# Patient Record
Sex: FEMALE | Race: BLACK OR AFRICAN AMERICAN | NOT HISPANIC OR LATINO | Employment: OTHER | URBAN - METROPOLITAN AREA
[De-identification: names, ages, dates, MRNs, and addresses within clinical notes are randomized per-mention and may not be internally consistent; named-entity substitution may affect disease eponyms.]

---

## 2017-10-23 ENCOUNTER — OFFICE VISIT (OUTPATIENT)
Dept: OPHTHALMOLOGY | Facility: CLINIC | Age: 67
End: 2017-10-23
Payer: MEDICARE

## 2017-10-23 DIAGNOSIS — H40.013 OAG (OPEN ANGLE GLAUCOMA) SUSPECT, LOW RISK, BILATERAL: Primary | ICD-10-CM

## 2017-10-23 DIAGNOSIS — Z96.1 PSEUDOPHAKIA OF BOTH EYES: ICD-10-CM

## 2017-10-23 DIAGNOSIS — E11.9 DIABETES MELLITUS TYPE 2 WITHOUT RETINOPATHY: ICD-10-CM

## 2017-10-23 DIAGNOSIS — H43.811 POSTERIOR VITREOUS DETACHMENT OF RIGHT EYE: ICD-10-CM

## 2017-10-23 PROCEDURE — 92083 EXTENDED VISUAL FIELD XM: CPT | Mod: PBBFAC,PO | Performed by: OPHTHALMOLOGY

## 2017-10-23 PROCEDURE — 92250 FUNDUS PHOTOGRAPHY W/I&R: CPT | Mod: PBBFAC,PO | Performed by: OPHTHALMOLOGY

## 2017-10-23 PROCEDURE — 92014 COMPRE OPH EXAM EST PT 1/>: CPT | Mod: S$PBB,,, | Performed by: OPHTHALMOLOGY

## 2017-10-23 PROCEDURE — 99212 OFFICE O/P EST SF 10 MIN: CPT | Mod: PBBFAC,PO | Performed by: OPHTHALMOLOGY

## 2017-10-23 PROCEDURE — 99999 PR PBB SHADOW E&M-EST. PATIENT-LVL II: CPT | Mod: PBBFAC,,, | Performed by: OPHTHALMOLOGY

## 2017-10-23 NOTE — PROGRESS NOTES
SUBJECTIVE:   Vane Earl is a 67 y.o. female   Uncorrected distance visual acuity was 20/20 -1 in the right eye and 20/30 in the left eye.   Chief Complaint   Patient presents with    Glaucoma Suspect     hvf, dilation, sdp's    Blurred Vision     c/o glare at night        HPI:  HPI     Glaucoma Suspect    Additional comments: hvf, dilation, sdp's           Blurred Vision    Additional comments: c/o glare at night           Comments   1. DM 2015  2. PCIOL OS 2/11/15  3. PCIOL OD 2/25/15  4. COAG suspect    No drops       Last edited by Bri Zarate MA on 10/23/2017  1:22 PM. (History)        Assessment /Plan :  1. OAG (open angle glaucoma) suspect, low risk, bilateral No evidence of glaucoma at this time but based on risk factors recommend to continue monitoring.   2. Diabetes mellitus type 2 without retinopathy No diabetic retinopathy at this time. Reviewed diabetic eye precautions including avoiding tobacco use,  Good glucose control, and importance of regular follow up.    3. Pseudophakia of both eyes stable   4. Posterior vitreous detachment of right eye Patient seen and evaluated for PVD OD. No tears or breaks were seen after careful retinal evaluation. Discussed retinal detachment signs and symptoms including flashes of lights, floaters, perceived curtains or veils. Advised to patient to monitor visual status including increase in flashes and floaters, or the development of visual field changes including curtain and /or veils. Advised patient to RTC urgently if these symptoms occur. Explained the need for follow up exams to the patient even if there are no changes in the symptoms.       Return to clinic in 1 year  or as needed.  With 24-2 HVF, Dilation and SDP's

## 2020-12-21 ENCOUNTER — OFFICE VISIT (OUTPATIENT)
Dept: OPHTHALMOLOGY | Facility: CLINIC | Age: 70
End: 2020-12-21
Payer: MEDICARE

## 2020-12-21 DIAGNOSIS — H40.019 OPEN ANGLE WITH BORDERLINE FINDINGS, LOW RISK: ICD-10-CM

## 2020-12-21 DIAGNOSIS — E11.9 DIABETES MELLITUS TYPE 2 WITHOUT RETINOPATHY: Primary | ICD-10-CM

## 2020-12-21 DIAGNOSIS — Z96.1 PSEUDOPHAKIA: ICD-10-CM

## 2020-12-21 PROCEDURE — 92133 POSTERIOR SEGMENT OCT OPTIC NERVE(OCULAR COHERENCE TOMOGRAPHY) - OU - BOTH EYES: ICD-10-PCS | Mod: 26,S$PBB,, | Performed by: OPHTHALMOLOGY

## 2020-12-21 PROCEDURE — 92004 PR EYE EXAM, NEW PATIENT,COMPREHESV: ICD-10-PCS | Mod: S$PBB,,, | Performed by: OPHTHALMOLOGY

## 2020-12-21 PROCEDURE — 92004 COMPRE OPH EXAM NEW PT 1/>: CPT | Mod: S$PBB,,, | Performed by: OPHTHALMOLOGY

## 2020-12-21 PROCEDURE — 99999 PR PBB SHADOW E&M-EST. PATIENT-LVL III: ICD-10-PCS | Mod: PBBFAC,,, | Performed by: OPHTHALMOLOGY

## 2020-12-21 PROCEDURE — 92133 CPTRZD OPH DX IMG PST SGM ON: CPT | Mod: PBBFAC | Performed by: OPHTHALMOLOGY

## 2020-12-21 PROCEDURE — 99999 PR PBB SHADOW E&M-EST. PATIENT-LVL III: CPT | Mod: PBBFAC,,, | Performed by: OPHTHALMOLOGY

## 2020-12-21 PROCEDURE — 99213 OFFICE O/P EST LOW 20 MIN: CPT | Mod: PBBFAC,25 | Performed by: OPHTHALMOLOGY

## 2020-12-21 RX ORDER — LOSARTAN POTASSIUM 100 MG/1
TABLET ORAL
COMMUNITY
Start: 2020-11-29

## 2020-12-21 NOTE — PROGRESS NOTES
SUBJECTIVE  Vane Earl is 70 y.o. female  Uncorrected distance visual acuity was 20/30 +2 in the right eye and 20/30 +2 in the left eye.   Chief Complaint   Patient presents with    Glaucoma Suspect     yearly          HPI     Glaucoma Suspect      Additional comments: yearly              Comments     The patient states that she is having trouble seeing far away and her   eyes are sometimes dry. She denies any pain.     1. DM 2015  2. PCIOL OS 2/11/15  3. PCIOL OD 2/25/15  4. COAG suspect  5. PVD OD    No drops          Last edited by Irene Vega on 12/21/2020  1:49 PM. (History)         Assessment /Plan :  1. Diabetes mellitus type 2 without retinopathy No diabetic retinopathy at this time. Reviewed diabetic eye precautions including avoiding tobacco use,  Good glucose control, and importance of regular follow up.      2. Open angle with borderline findings of both eyes, low risk- No evidence of glaucoma at this time but based on risk factors recommend to continue monitoring.     3. Pseudophakia -stable      Return to clinic in 1 year  or as needed.  With GOCT and Dilation

## 2022-02-14 ENCOUNTER — OFFICE VISIT (OUTPATIENT)
Dept: OPHTHALMOLOGY | Facility: CLINIC | Age: 72
End: 2022-02-14
Payer: MEDICARE

## 2022-02-14 DIAGNOSIS — H40.019 OPEN ANGLE WITH BORDERLINE FINDINGS, LOW RISK: Primary | ICD-10-CM

## 2022-02-14 DIAGNOSIS — Z96.1 PSEUDOPHAKIA: ICD-10-CM

## 2022-02-14 DIAGNOSIS — H04.129 DRY EYE: ICD-10-CM

## 2022-02-14 DIAGNOSIS — E11.9 DIABETES MELLITUS TYPE 2 WITHOUT RETINOPATHY: ICD-10-CM

## 2022-02-14 PROCEDURE — 92133 POSTERIOR SEGMENT OCT OPTIC NERVE(OCULAR COHERENCE TOMOGRAPHY) - OU - BOTH EYES: ICD-10-PCS | Mod: 26,S$PBB,, | Performed by: OPHTHALMOLOGY

## 2022-02-14 PROCEDURE — 99213 PR OFFICE/OUTPT VISIT, EST, LEVL III, 20-29 MIN: ICD-10-PCS | Mod: S$PBB,,, | Performed by: OPHTHALMOLOGY

## 2022-02-14 PROCEDURE — 99213 OFFICE O/P EST LOW 20 MIN: CPT | Mod: S$PBB,,, | Performed by: OPHTHALMOLOGY

## 2022-02-14 PROCEDURE — 99999 PR PBB SHADOW E&M-EST. PATIENT-LVL III: CPT | Mod: PBBFAC,,, | Performed by: OPHTHALMOLOGY

## 2022-02-14 PROCEDURE — 92133 CPTRZD OPH DX IMG PST SGM ON: CPT | Mod: PBBFAC | Performed by: OPHTHALMOLOGY

## 2022-02-14 PROCEDURE — 99213 OFFICE O/P EST LOW 20 MIN: CPT | Mod: PBBFAC | Performed by: OPHTHALMOLOGY

## 2022-02-14 PROCEDURE — 99999 PR PBB SHADOW E&M-EST. PATIENT-LVL III: ICD-10-PCS | Mod: PBBFAC,,, | Performed by: OPHTHALMOLOGY

## 2022-02-14 NOTE — LETTER
[facility]  [address]  [place] [address]  Phone: [phone]  Fax: [phone]   February 14, 2022    Nikki Zelaya MD  [address] [address]    Patient: Vane Earl   MR Number: [id number]   YOB: 1950   Date of Visit: 2/14/2022       Dear Dr. Zelaya :    Thank you for referring Vane Earl to me for evaluation. Here is my assessment and plan of care:    Assessment/Plan :  1. Open angle with borderline findings, low risk No evidence of glaucoma at this time but based on risk factors recommend to continue monitoring.    Return to clinic in 6 months  or as needed.  With IOP Check and GOCT     2. Diabetes mellitus type 2 without retinopathy No diabetic retinopathy at this time. Reviewed diabetic eye precautions including avoiding tobacco use,  Good glucose control, and importance of regular follow up.    3. Pseudophakia    4.  Dry eye - Findings and symptoms consistent with mild dry eyes. Dry eye instruction sheet reviewed with patient recommending:AT's qid/titrate prn, Genteal Gel prn, Lubricating Oint qhs and prn and [place] 3 Fish Oils [id number] mg po bid           If you have questions, please do not hesitate to call me. I look forward to following Ms. Vane Earl along with you.    Sincerely,        Eber Castaneda MD

## 2022-02-14 NOTE — PROGRESS NOTES
SUBJECTIVE  Vane Earl is 71 y.o. female  Uncorrected distance visual acuity was 20/25 +1 in the right eye and 20/20 in the left eye.   Chief Complaint   Patient presents with    Diabetic Eye Exam     Pt here for annual DM exam. No pain or discomfort. Pt says she has noticed a decline in her distance vision.     Lab Results       Component                Value               Date                       HGBA1C                   6.8 (H)             02/03/2022                    HPI     Diabetic Eye Exam      Additional comments: Pt here for annual DM exam. No pain or discomfort.   Pt says she has noticed a decline in her distance vision.     Lab Results       Component                Value               Date                       HGBA1C                   6.8 (H)             02/03/2022                        Comments     Referred by Ophelia Mora([facility] did her phaco sx)-sister  1. DM 2015  2. PCIOL OS 2/11/15  3. PCIOL OD 2/25/15  4. COAG suspect  5. PVD OD    No drops          Last edited by Jorge Alberto Meléndez MA on 2/14/2022  8:34 AM. (History)         Assessment /Plan :  1. Open angle with borderline findings, low risk No evidence of glaucoma at this time but based on risk factors recommend to continue monitoring.    Return to clinic in 6 months  or as needed.  With IOP Check and GOCT     2. Diabetes mellitus type 2 without retinopathy No diabetic retinopathy at this time. Reviewed diabetic eye precautions including avoiding tobacco use,  Good glucose control, and importance of regular follow up.    3. Pseudophakia    4.  Dry eye - Findings and symptoms consistent with mild dry eyes. Dry eye instruction sheet reviewed with patient recommending:AT's qid/titrate prn, Genteal Gel prn, Lubricating Oint qhs and prn and [place] 3 Fish Oils [id number] mg po bid

## 2022-08-15 ENCOUNTER — OFFICE VISIT (OUTPATIENT)
Dept: OPHTHALMOLOGY | Facility: CLINIC | Age: 72
End: 2022-08-15
Payer: MEDICARE

## 2022-08-15 DIAGNOSIS — Z96.1 PSEUDOPHAKIA: ICD-10-CM

## 2022-08-15 DIAGNOSIS — H40.1131 PRIMARY OPEN ANGLE GLAUCOMA (POAG) OF BOTH EYES, MILD STAGE: Primary | ICD-10-CM

## 2022-08-15 DIAGNOSIS — H04.129 DRY EYE: ICD-10-CM

## 2022-08-15 PROCEDURE — 99999 PR PBB SHADOW E&M-EST. PATIENT-LVL III: CPT | Mod: PBBFAC,,, | Performed by: OPHTHALMOLOGY

## 2022-08-15 PROCEDURE — 99214 PR OFFICE/OUTPT VISIT, EST, LEVL IV, 30-39 MIN: ICD-10-PCS | Mod: S$PBB,,, | Performed by: OPHTHALMOLOGY

## 2022-08-15 PROCEDURE — 92133 POSTERIOR SEGMENT OCT OPTIC NERVE(OCULAR COHERENCE TOMOGRAPHY) - OU - BOTH EYES: ICD-10-PCS | Mod: 26,S$PBB,, | Performed by: OPHTHALMOLOGY

## 2022-08-15 PROCEDURE — 99214 OFFICE O/P EST MOD 30 MIN: CPT | Mod: S$PBB,,, | Performed by: OPHTHALMOLOGY

## 2022-08-15 PROCEDURE — 99999 PR PBB SHADOW E&M-EST. PATIENT-LVL III: ICD-10-PCS | Mod: PBBFAC,,, | Performed by: OPHTHALMOLOGY

## 2022-08-15 PROCEDURE — 92133 CPTRZD OPH DX IMG PST SGM ON: CPT | Mod: PBBFAC | Performed by: OPHTHALMOLOGY

## 2022-08-15 PROCEDURE — 99213 OFFICE O/P EST LOW 20 MIN: CPT | Mod: PBBFAC | Performed by: OPHTHALMOLOGY

## 2022-08-15 RX ORDER — LATANOPROST 50 UG/ML
1 SOLUTION/ DROPS OPHTHALMIC NIGHTLY
Qty: 3 ML | Refills: 12 | Status: SHIPPED | OUTPATIENT
Start: 2022-08-15 | End: 2023-10-02

## 2022-08-15 NOTE — PROGRESS NOTES
SUBJECTIVE  Vane Earl is 72 y.o. female  Uncorrected distance visual acuity was 20/25 in the right eye and 20/25 -2 in the left eye.   Chief Complaint   Patient presents with    Glaucoma Suspect     Pt reports for 6m GOCT and IOP check. Denies any pain or irritation. Va stable. Using AT prn.           HPI     Glaucoma Suspect      Additional comments: Pt reports for 6m GOCT and IOP check. Denies any   pain or irritation. Va stable. Using AT prn.               Comments     1. DM 2015  2. PCIOL OS 2/11/15  3. PCIOL OD 2/25/15  4. COAG suspect (Fhx brother)  5. PVD OD  6. Dry eyes    No drops            Last edited by Salas Del Real on 8/15/2022 11:11 AM. (History)         Assessment /Plan :  1. Primary open angle glaucoma (POAG) of both eyes, mild stage newly diagnosed today  Intraocular pressure (IOP) not within acceptable range relative to target IOP with risk of irreversible visual loss. Better IOP control is recommended. Treatment options may include change or additional medications, Selective Laser Trabeculoplasty (SLT laser), and/or incisional glaucoma surgery. Reviewed importance of continued compliance with treatment and follow up.     Patient chooses to add Latanoprost one drop in each eye as needed    Return to clinic in 1 month  or as needed.  With IOP Check     2. Pseudophakia  -- Condition stable, no therapeutic change required. Monitoring routinely.     3. Dry eye - recommend artificial tears one drop in each eye as needed

## 2022-09-08 ENCOUNTER — OFFICE VISIT (OUTPATIENT)
Dept: OPHTHALMOLOGY | Facility: CLINIC | Age: 72
End: 2022-09-08
Payer: MEDICARE

## 2022-09-08 DIAGNOSIS — Z96.1 PSEUDOPHAKIA: ICD-10-CM

## 2022-09-08 DIAGNOSIS — H40.1131 PRIMARY OPEN ANGLE GLAUCOMA (POAG) OF BOTH EYES, MILD STAGE: Primary | ICD-10-CM

## 2022-09-08 PROCEDURE — 99214 PR OFFICE/OUTPT VISIT, EST, LEVL IV, 30-39 MIN: ICD-10-PCS | Mod: S$PBB,,, | Performed by: OPHTHALMOLOGY

## 2022-09-08 PROCEDURE — 99999 PR PBB SHADOW E&M-EST. PATIENT-LVL III: CPT | Mod: PBBFAC,,, | Performed by: OPHTHALMOLOGY

## 2022-09-08 PROCEDURE — 99999 PR PBB SHADOW E&M-EST. PATIENT-LVL III: ICD-10-PCS | Mod: PBBFAC,,, | Performed by: OPHTHALMOLOGY

## 2022-09-08 PROCEDURE — 99213 OFFICE O/P EST LOW 20 MIN: CPT | Mod: PBBFAC | Performed by: OPHTHALMOLOGY

## 2022-09-08 PROCEDURE — 99214 OFFICE O/P EST MOD 30 MIN: CPT | Mod: S$PBB,,, | Performed by: OPHTHALMOLOGY

## 2022-09-08 NOTE — PROGRESS NOTES
SUBJECTIVE  Vane Earl is 72 y.o. female  Corrected distance visual acuity was 20/20 in the right eye and 20/25 -1 in the left eye.   Chief Complaint   Patient presents with    Glaucoma     1 mth IOP, latanoprost trial and IOP check   Pt states she is doing well with new drop           HPI     Glaucoma     Additional comments: 1 mth IOP, latanoprost trial and IOP check   Pt states she is doing well with new drop            Comments    PCP: Dr. Flores    Referred by Ophelia Mora([facility] did her phaco sx)-sister  1. DM 2015  2. PCIOL OS 2/11/15  3. PCIOL OD 2/25/15  4. Mild COAG goal = 17 (Fhx brother)  5. PVD OD  6. Dry eyes    Latanoprost QHS OU           Last edited by FADI Bello on 9/8/2022 11:13 AM.         Assessment /Plan :  1. Primary open angle glaucoma (POAG) of both eyes, mild stage Doing well, intraocular pressure (IOP) within acceptable range relative to target IOP and no evidence of progression. Continue current treatment. Reviewed importance of continued compliance with treatment and follow up.      Patient instructed to continue using the following glaucoma medication as follows:  Latanoprost one drop in each eye nightly    Return to clinic in 4 months  or as needed.  With IOP Check, GOCT, and HVF 24-2     2. Pseudophakia  -- Condition stable, no therapeutic change required. Monitoring routinely.

## 2023-01-12 ENCOUNTER — OFFICE VISIT (OUTPATIENT)
Dept: OPHTHALMOLOGY | Facility: CLINIC | Age: 73
End: 2023-01-12
Payer: MEDICARE

## 2023-01-12 DIAGNOSIS — Z96.1 PSEUDOPHAKIA: ICD-10-CM

## 2023-01-12 DIAGNOSIS — H04.129 DRY EYE: ICD-10-CM

## 2023-01-12 DIAGNOSIS — H40.1131 PRIMARY OPEN ANGLE GLAUCOMA (POAG) OF BOTH EYES, MILD STAGE: Primary | ICD-10-CM

## 2023-01-12 PROCEDURE — 92133 CPTRZD OPH DX IMG PST SGM ON: CPT | Mod: PBBFAC | Performed by: OPHTHALMOLOGY

## 2023-01-12 PROCEDURE — 92133 POSTERIOR SEGMENT OCT OPTIC NERVE(OCULAR COHERENCE TOMOGRAPHY) - OU - BOTH EYES: ICD-10-PCS | Mod: 26,S$PBB,, | Performed by: OPHTHALMOLOGY

## 2023-01-12 PROCEDURE — 99213 OFFICE O/P EST LOW 20 MIN: CPT | Mod: PBBFAC | Performed by: OPHTHALMOLOGY

## 2023-01-12 PROCEDURE — 92083 EXTENDED VISUAL FIELD XM: CPT | Mod: PBBFAC | Performed by: OPHTHALMOLOGY

## 2023-01-12 PROCEDURE — 99214 PR OFFICE/OUTPT VISIT, EST, LEVL IV, 30-39 MIN: ICD-10-PCS | Mod: S$PBB,,, | Performed by: OPHTHALMOLOGY

## 2023-01-12 PROCEDURE — 99214 OFFICE O/P EST MOD 30 MIN: CPT | Mod: S$PBB,,, | Performed by: OPHTHALMOLOGY

## 2023-01-12 PROCEDURE — 99999 PR PBB SHADOW E&M-EST. PATIENT-LVL III: ICD-10-PCS | Mod: PBBFAC,,, | Performed by: OPHTHALMOLOGY

## 2023-01-12 PROCEDURE — 99999 PR PBB SHADOW E&M-EST. PATIENT-LVL III: CPT | Mod: PBBFAC,,, | Performed by: OPHTHALMOLOGY

## 2023-01-12 PROCEDURE — 92083 HUMPHREY VISUAL FIELD - OU - BOTH EYES: ICD-10-PCS | Mod: 26,S$PBB,, | Performed by: OPHTHALMOLOGY

## 2023-01-12 NOTE — PROGRESS NOTES
SUBJECTIVE  Vane Earl is 72 y.o. female  Uncorrected distance visual acuity was 20/20 -1 in the right eye and 20/25 in the left eye.   Chief Complaint   Patient presents with    Glaucoma     Patient reports for 4 month IOP check. Using gtts as advised. Denies pain or irritation at this time. Patient reports of visual stability since previous visit.             HPI     Glaucoma     Additional comments: Patient reports for 4 month IOP check. Using gtts as   advised. Denies pain or irritation at this time. Patient reports of visual   stability since previous visit.              Comments    PCP: Dr. Zelaya    Referred by Ophelia Mora([facility] did her phaco sx)-sister  1. DM 2015  2. PCIOL OS 2/11/15  3. PCIOL OD 2/25/15  4. Mild COAG goal = 17 (Fhx brother)  5. PVD OD  6. Dry eyes    Latanoprost QHS OU           Last edited by Lei Parsons on 1/12/2023 10:55 AM.         Assessment /Plan :  1. Primary open angle glaucoma (POAG) of both eyes, mild stage Doing well, intraocular pressure (IOP) within acceptable range relative to target IOP and no evidence of progression. Continue current treatment. Reviewed importance of continued compliance with treatment and follow up.      Patient instructed to continue using the following glaucoma medication as follows:  Latanoprost one drop in each eye nightly    Return to clinic in 4 months  or as needed.  With Dilation     2. Pseudophakia  -- Condition stable, no therapeutic change required. Monitoring routinely.     3. Dry eye  -- Condition stable, no therapeutic change required. Monitoring routinely.

## 2023-05-15 ENCOUNTER — OFFICE VISIT (OUTPATIENT)
Dept: OPHTHALMOLOGY | Facility: CLINIC | Age: 73
End: 2023-05-15
Payer: MEDICARE

## 2023-05-15 DIAGNOSIS — H40.1131 PRIMARY OPEN ANGLE GLAUCOMA (POAG) OF BOTH EYES, MILD STAGE: Primary | ICD-10-CM

## 2023-05-15 DIAGNOSIS — H26.493 PCO (POSTERIOR CAPSULAR OPACIFICATION), BILATERAL: ICD-10-CM

## 2023-05-15 DIAGNOSIS — Z96.1 PSEUDOPHAKIA: ICD-10-CM

## 2023-05-15 DIAGNOSIS — E11.9 DIABETES MELLITUS TYPE 2 WITHOUT RETINOPATHY: ICD-10-CM

## 2023-05-15 DIAGNOSIS — H04.129 DRY EYE: ICD-10-CM

## 2023-05-15 PROCEDURE — 99214 OFFICE O/P EST MOD 30 MIN: CPT | Mod: 57,S$PBB,, | Performed by: OPHTHALMOLOGY

## 2023-05-15 PROCEDURE — 99213 OFFICE O/P EST LOW 20 MIN: CPT | Mod: PBBFAC,25 | Performed by: OPHTHALMOLOGY

## 2023-05-15 PROCEDURE — 66821 AFTER CATARACT LASER SURGERY: CPT | Mod: 50,S$PBB,, | Performed by: OPHTHALMOLOGY

## 2023-05-15 PROCEDURE — 66821 PR DISCISSION,2ND CATARACT,LASER: ICD-10-PCS | Mod: 50,S$PBB,, | Performed by: OPHTHALMOLOGY

## 2023-05-15 PROCEDURE — 99999 PR PBB SHADOW E&M-EST. PATIENT-LVL III: ICD-10-PCS | Mod: PBBFAC,,, | Performed by: OPHTHALMOLOGY

## 2023-05-15 PROCEDURE — 99999 PR PBB SHADOW E&M-EST. PATIENT-LVL III: CPT | Mod: PBBFAC,,, | Performed by: OPHTHALMOLOGY

## 2023-05-15 PROCEDURE — 99214 PR OFFICE/OUTPT VISIT, EST, LEVL IV, 30-39 MIN: ICD-10-PCS | Mod: 57,S$PBB,, | Performed by: OPHTHALMOLOGY

## 2023-05-15 PROCEDURE — 66821 AFTER CATARACT LASER SURGERY: CPT | Mod: 50,PBBFAC | Performed by: OPHTHALMOLOGY

## 2023-05-15 RX ORDER — PREDNISOLONE ACETATE 10 MG/ML
1 SUSPENSION/ DROPS OPHTHALMIC 4 TIMES DAILY
Qty: 5 ML | Refills: 0 | Status: SHIPPED | OUTPATIENT
Start: 2023-05-15 | End: 2023-05-22

## 2023-05-15 NOTE — PROGRESS NOTES
SUBJECTIVE  Vane Earl is 73 y.o. female  Uncorrected distance visual acuity was 20/20 in the right eye and 20/30 in the left eye.   Chief Complaint   Patient presents with    Glaucoma Suspect     Patient states va is stable. Patient states she is using her eye drops as directed. Patient states she sometimes has a dull pain but it is not unbearable. Patient denies any irritation at this time.          HPI     Glaucoma Suspect     Additional comments: Patient states va is stable. Patient states she is   using her eye drops as directed. Patient states she sometimes has a dull   pain but it is not unbearable. Patient denies any irritation at this time.           Comments    1. DM 2015  2. PCIOL OS 2/11/15  3. PCIOL OD 2/25/15  4. Mild COAG goal = 17 (Fhx brother)  5. PVD OD  6. Dry eyes    Latanoprost QHS OU            Last edited by Sayra Yee on 5/15/2023 10:58 AM.         Assessment /Plan :  1. Primary open angle glaucoma (POAG) of both eyes, mild stage    2. PCO (posterior capsular opacification), bilateral Yag Capsulotomy Procedure:    73 y.o. year old patient experiencing a symptomatic decrease in vision OU with inability to perform activities of daily living including reading.    SLE: Posterior intraocular lens implant with capsular fibrosis     Risks, benefits and alternatives of Yag Laser Capsulotomy discussed. Including risks of retinal detachment (1-3%), macular edema, dislocated implant, pain, inflammation elevated intraocular pressure and vision loss. Consent signed.    Medications given:  Apraclonidine gtt  Tetracaine gtt    OD Laser energy settings:  107.7  Total mJ   61  bursts    Post Laser IOP 11 mmHg    OS Laser energy settings:  157.7  Total mJ   83  bursts    Post Laser IOP 12 mmHg    IMPRESSION:  Yag Capsulotomy OU well tolerated    PLAN:  1. Prednisolone 1% QID over 1 week  2. Postoperative precautions discussed  3. RTC 2-3 weeks or prn     3. Diabetes mellitus type 2 without retinopathy  No diabetic retinopathy at this time. Reviewed diabetic eye precautions including avoiding tobacco use,  Good glucose control, and importance of regular follow up.      4. Pseudophakia  -- Condition stable, no therapeutic change required. Monitoring routinely.     5. Dry eye

## 2023-06-05 ENCOUNTER — OFFICE VISIT (OUTPATIENT)
Dept: OPHTHALMOLOGY | Facility: CLINIC | Age: 73
End: 2023-06-05
Payer: COMMERCIAL

## 2023-06-05 DIAGNOSIS — Z98.890 POST-OPERATIVE STATE: Primary | ICD-10-CM

## 2023-06-05 PROCEDURE — 99999 PR PBB SHADOW E&M-EST. PATIENT-LVL III: CPT | Mod: PBBFAC,,, | Performed by: OPHTHALMOLOGY

## 2023-06-05 PROCEDURE — 99024 POSTOP FOLLOW-UP VISIT: CPT | Mod: S$GLB,,, | Performed by: OPHTHALMOLOGY

## 2023-06-05 PROCEDURE — 99999 PR PBB SHADOW E&M-EST. PATIENT-LVL III: ICD-10-PCS | Mod: PBBFAC,,, | Performed by: OPHTHALMOLOGY

## 2023-06-05 PROCEDURE — 99024 PR POST-OP FOLLOW-UP VISIT: ICD-10-PCS | Mod: S$GLB,,, | Performed by: OPHTHALMOLOGY

## 2023-06-05 NOTE — PROGRESS NOTES
SUBJECTIVE  Vane Earl is 73 y.o. female  Uncorrected distance visual acuity was 20/30 in the right eye and 20/25 in the left eye.   Chief Complaint   Patient presents with    Post-op Evaluation          HPI    States that her vision is stable but complains of glare in her OD. States   that she has been compliant with gtts as directed.     1. DM 2015  2. PCIOL OS 2/11/15  3. PCIOL OD 2/25/15  4. YAG OU 05/15/23  5. Mild COAG goal = 17 (Fhx brother)  6. PVD OD  7. Dry eyes    Latanoprost QHS OU    Last edited by Toma Feliz on 6/5/2023 11:27 AM.         Assessment /Plan :  1. Post-operative state Exam:  SLE:  L/L- normal  S/C- white  K- stable  AC- no cells  LENS- PCIOL with clear capsulotomy    Assessment:    S/P Yag Capsulotomy OU . Discussed options for spectacle correction and pt elects to use OTC Readers. Recommend follow with Dr. Castaneda in 4 months for IOP check and GOCT, or sooner if needed.  Continue Latanoprost one drop in each eye every night

## 2023-09-30 DIAGNOSIS — H40.1131 PRIMARY OPEN ANGLE GLAUCOMA (POAG) OF BOTH EYES, MILD STAGE: ICD-10-CM

## 2023-10-02 RX ORDER — LATANOPROST 50 UG/ML
1 SOLUTION/ DROPS OPHTHALMIC DAILY
Qty: 2.5 ML | Refills: 12 | Status: SHIPPED | OUTPATIENT
Start: 2023-10-02 | End: 2024-10-01

## 2023-10-05 ENCOUNTER — OFFICE VISIT (OUTPATIENT)
Dept: OPHTHALMOLOGY | Facility: CLINIC | Age: 73
End: 2023-10-05
Payer: COMMERCIAL

## 2023-10-05 DIAGNOSIS — Z96.1 PSEUDOPHAKIA: ICD-10-CM

## 2023-10-05 DIAGNOSIS — H40.1131 PRIMARY OPEN ANGLE GLAUCOMA (POAG) OF BOTH EYES, MILD STAGE: Primary | ICD-10-CM

## 2023-10-05 PROCEDURE — 99214 PR OFFICE/OUTPT VISIT, EST, LEVL IV, 30-39 MIN: ICD-10-PCS | Mod: S$PBB,,, | Performed by: OPHTHALMOLOGY

## 2023-10-05 PROCEDURE — 92133 CPTRZD OPH DX IMG PST SGM ON: CPT | Mod: PBBFAC | Performed by: OPHTHALMOLOGY

## 2023-10-05 PROCEDURE — 92133 POSTERIOR SEGMENT OCT OPTIC NERVE(OCULAR COHERENCE TOMOGRAPHY) - OU - BOTH EYES: ICD-10-PCS | Mod: 26,S$PBB,, | Performed by: OPHTHALMOLOGY

## 2023-10-05 PROCEDURE — 99213 OFFICE O/P EST LOW 20 MIN: CPT | Mod: PBBFAC | Performed by: OPHTHALMOLOGY

## 2023-10-05 PROCEDURE — 99214 OFFICE O/P EST MOD 30 MIN: CPT | Mod: S$PBB,,, | Performed by: OPHTHALMOLOGY

## 2023-10-05 PROCEDURE — 99999 PR PBB SHADOW E&M-EST. PATIENT-LVL III: CPT | Mod: PBBFAC,,, | Performed by: OPHTHALMOLOGY

## 2023-10-05 PROCEDURE — 99999 PR PBB SHADOW E&M-EST. PATIENT-LVL III: ICD-10-PCS | Mod: PBBFAC,,, | Performed by: OPHTHALMOLOGY

## 2023-10-05 NOTE — PROGRESS NOTES
SUBJECTIVE  Vane Earl is 73 y.o. female  Uncorrected distance visual acuity was 20/25 in the right eye and 20/20 in the left eye.   Chief Complaint   Patient presents with    Glaucoma     4 month IOP and GOCT follow up. VA is doing well, no changes. No pain or irritation. Compliant with gtts.           HPI     Glaucoma     Additional comments: 4 month IOP and GOCT follow up. VA is doing well, no   changes. No pain or irritation. Compliant with gtts.            Comments    PCP: Dr. Zelaya    Referred by Ophelia Mora([facility] did her phaco sx)-sister  1. DM 2015  2. PCIOL OS 2/11/15  3. PCIOL OD 2/25/15  4. YAG OU 05/15/23  5. Mild COAG goal = 17 (Fhx brother)  6. PVD OD  7. Dry eyes    Latanoprost QHS OU            Last edited by Kleber Aguiar on 10/5/2023 10:21 AM.         Assessment /Plan :  1. Primary open angle glaucoma (POAG) of both eyes, mild stage Doing well, intraocular pressure (IOP) within acceptable range relative to target IOP and no evidence of progression. Continue current treatment. Reviewed importance of continued compliance with treatment and follow up.      Patient instructed to continue using the following glaucoma medication as follows:  Latanoprost one drop in each eye nightly    Return to clinic in 4 months  or as needed.  With IOP Check     2. Pseudophakia  -- Condition stable, no therapeutic change required. Monitoring routinely.

## 2024-02-08 ENCOUNTER — OFFICE VISIT (OUTPATIENT)
Dept: OPHTHALMOLOGY | Facility: CLINIC | Age: 74
End: 2024-02-08
Payer: MEDICARE

## 2024-02-08 DIAGNOSIS — Z96.1 PSEUDOPHAKIA: ICD-10-CM

## 2024-02-08 DIAGNOSIS — H40.1131 PRIMARY OPEN ANGLE GLAUCOMA (POAG) OF BOTH EYES, MILD STAGE: Primary | ICD-10-CM

## 2024-02-08 PROCEDURE — 99213 OFFICE O/P EST LOW 20 MIN: CPT | Mod: PBBFAC | Performed by: OPHTHALMOLOGY

## 2024-02-08 PROCEDURE — 99999 PR PBB SHADOW E&M-EST. PATIENT-LVL III: CPT | Mod: PBBFAC,,, | Performed by: OPHTHALMOLOGY

## 2024-02-08 PROCEDURE — 99214 OFFICE O/P EST MOD 30 MIN: CPT | Mod: S$PBB,,, | Performed by: OPHTHALMOLOGY

## 2024-02-08 NOTE — PROGRESS NOTES
SUBJECTIVE  Vane Earl is 73 y.o. female  Uncorrected distance visual acuity was 20/20 -1 in the right eye and 20/20 in the left eye.   Chief Complaint   Patient presents with    Glaucoma     Pt here for 4m IOP chk. No pain or discomfort. VA stable. 100% compliant with gtts.           HPI     Glaucoma     Additional comments: Pt here for 4m IOP chk. No pain or discomfort. VA   stable. 100% compliant with gtts.            Comments    Referred by Ophelia Mora([facility] did her phaco sx)-sister  1. Mild COAG goal = 17 (Fhx brother)  2. PCIOL OS 2/11/15  3. PCIOL OD 2/25/15  4. YAG OU 05/15/23  5. DM 2015  6. PVD OD  7. Dry eyes    Latanoprost QHS OU             Last edited by Jorge Alberto Meléndez MA on 2/8/2024 10:12 AM.         Assessment /Plan :  1. Primary open angle glaucoma (POAG) of both eyes, mild stage Doing well, intraocular pressure (IOP) within acceptable range relative to target IOP and no evidence of progression. Continue current treatment. Reviewed importance of continued compliance with treatment and follow up.      Patient instructed to continue using the following glaucoma medication as follows:  Latanoprost one drop in each eye nightly    Return to clinic in 4 months  or as needed.  With GOCT, Dilation, and HVF 24-2     2. Pseudophakia  -- Condition stable, no therapeutic change required. Monitoring routinely.

## 2024-07-01 ENCOUNTER — HOSPITAL ENCOUNTER (OUTPATIENT)
Dept: RADIOLOGY | Facility: HOSPITAL | Age: 74
Discharge: HOME | End: 2024-07-01
Attending: ORTHOPAEDIC SURGERY
Payer: COMMERCIAL

## 2024-07-01 ENCOUNTER — OFFICE VISIT (OUTPATIENT)
Dept: ORTHOPEDICS | Facility: CLINIC | Age: 74
End: 2024-07-01
Payer: MEDICARE

## 2024-07-01 VITALS
DIASTOLIC BLOOD PRESSURE: 88 MMHG | HEIGHT: 69 IN | BODY MASS INDEX: 30.51 KG/M2 | WEIGHT: 206 LBS | SYSTOLIC BLOOD PRESSURE: 130 MMHG

## 2024-07-01 DIAGNOSIS — M22.41 CHONDROMALACIA PATELLAE, RIGHT: Primary | ICD-10-CM

## 2024-07-01 PROCEDURE — 73560 X-RAY EXAM OF KNEE 1 OR 2: CPT | Mod: TC,PN,RT

## 2024-07-01 PROCEDURE — 99999 PR PBB SHADOW E&M-EST. PATIENT-LVL V: CPT | Mod: PBBFAC,,, | Performed by: ORTHOPAEDIC SURGERY

## 2024-07-01 PROCEDURE — 73560 X-RAY EXAM OF KNEE 1 OR 2: CPT | Mod: 26,RT,, | Performed by: RADIOLOGY

## 2024-07-01 RX ORDER — LANCETS
1 EACH MISCELLANEOUS
COMMUNITY
Start: 2023-11-29

## 2024-07-01 RX ORDER — TRIAMCINOLONE ACETONIDE 40 MG/ML
40 INJECTION, SUSPENSION INTRA-ARTICULAR; INTRAMUSCULAR
Status: DISCONTINUED | OUTPATIENT
Start: 2024-07-01 | End: 2024-07-01 | Stop reason: HOSPADM

## 2024-07-01 RX ORDER — AMLODIPINE BESYLATE 10 MG/1
1 TABLET ORAL DAILY
COMMUNITY
Start: 2024-02-29 | End: 2025-02-28

## 2024-07-01 RX ORDER — BLOOD SUGAR DIAGNOSTIC
STRIP MISCELLANEOUS
COMMUNITY

## 2024-07-01 RX ORDER — FAMOTIDINE 20 MG/1
20 TABLET, FILM COATED ORAL 2 TIMES DAILY
COMMUNITY

## 2024-07-01 RX ORDER — FLUTICASONE PROPIONATE 50 MCG
1 SPRAY, SUSPENSION (ML) NASAL
COMMUNITY

## 2024-07-01 RX ORDER — LANCETS 33 GAUGE
EACH MISCELLANEOUS
COMMUNITY

## 2024-07-01 RX ADMIN — TRIAMCINOLONE ACETONIDE 40 MG: 40 INJECTION, SUSPENSION INTRA-ARTICULAR; INTRAMUSCULAR at 10:07

## 2024-07-01 NOTE — PROGRESS NOTES
Subjective:       Patient ID: Vane Earl is a 74 y.o. female.    Chief Complaint: Pain of the Right Knee (Patient is here with complaints of right knee pain x 4 months. Has off and on shooting pain that radiates as well as feeling of wanting to give way. Also popping and grinding )      History of Present Illness    Prior to meeting with the patient I reviewed the medical chart in [facility]. This included reviewing the previous progress notes from our office, review of the patient's last appointment with their primary care provider, review of any visits to the emergency room, and review of any pain management appointments or procedures.   History of chronic right knee pain without trauma does note popping and locking in her knee.    Current Medications  Current Outpatient Medications   Medication Sig Dispense Refill    albuterol (PROVENTIL/VENTOLIN HFA) 90 mcg/actuation inhaler Inhale 2 puffs into the lungs every 6 (six) hours as needed for Wheezing.      amLODIPine (NORVASC) 10 MG tablet Take 1 tablet by mouth once daily.      ammonium lactate 12 % Crea   6    aspirin 81 MG Chew Take 81 mg by mouth once daily.      atorvastatin (LIPITOR) 40 MG tablet Take 40 mg by mouth once daily.      blood sugar diagnostic Strp 1 strip by Other route.      empagliflozin (JARDIANCE) 10 mg tablet Take 1 tablet by mouth once daily.      famotidine (PEPCID) 20 MG tablet Take 20 mg by mouth 2 (two) times daily.      fluticasone propionate (FLONASE) 50 mcg/actuation nasal spray 1 spray by Nasal route.      fluticasone-salmeterol 100-50 mcg/dose (ADVAIR) 100-50 mcg/dose diskus inhaler Inhale 1 puff into the lungs 2 (two) times daily.      lancets Misc 1 Device by Other route.      latanoprost (XALATAN) 0.005 % ophthalmic solution Place 1 drop into both eyes once daily. 2.5 mL 12    levocetirizine (XYZAL) 5 MG tablet       losartan (COZAAR) 100 MG tablet TK 1 T PO D      metoprolol succinate (TOPROL-XL) 100 MG 24 hr tablet Take 100 mg  by mouth once daily.      ONETOUCH DELICA PLUS LANCET 33 gauge Misc Apply topically.      ONETOUCH ULTRA TEST Strp Use      potassium chloride SA (KLOR-CON M15) 15 MEQ tablet Take 15 mEq by mouth once daily.      rabeprazole (ACIPHEX) 20 mg tablet Take 20 mg by mouth once daily.      tramadol (ULTRAM) 50 mg tablet   1    triamterene-hydrochlorothiazide 75-50 mg (MAXZIDE) 75-50 mg per tablet       valsartan (DIOVAN) 320 MG tablet Take 320 mg by mouth once daily.       No current facility-administered medications for this visit.       Allergies  Review of patient's allergies indicates:   Allergen Reactions    Bactrim [sulfamethoxazole-trimethoprim] Rash    Pcn [penicillins] Swelling and Rash       Past Medical History  Past Medical History:   Diagnosis Date    Acute on chronic diastolic congestive heart failure 12/10/2014    Asthma     Diabetes mellitus     GERD (gastroesophageal reflux disease)     Hyperlipidemia     Hypertension        Surgical History  Past Surgical History:   Procedure Laterality Date    CATARACT EXTRACTION Right 2/20/15    CATARACT EXTRACTION Left 2/11/15     SECTION      HYSTERECTOMY         Family History:   Family History   Problem Relation Name Age of Onset    Cancer Father      Cataracts Sister      Hypertension Sister      Cancer Sister      Hypertension Brother      Diabetes Brother      Strabismus Neg Hx      Retinal detachment Neg Hx      Macular degeneration Neg Hx      Glaucoma Neg Hx      Blindness Neg Hx      Amblyopia Neg Hx         Social History:   Social History     Socioeconomic History    Marital status:    Tobacco Use    Smoking status: Former    Smokeless tobacco: Never   Substance and Sexual Activity    Alcohol use: No    Drug use: No     Social Determinants of Health     Financial Resource Strain: Low Risk  (5/3/2021)    Received from [facility], [facility]    Overall Financial Resource Strain (CARDIA)     Difficulty of Paying Living Expenses: Not hard  at all   Food Insecurity: No Food Insecurity (5/3/2021)    [facility]    Hunger Vital Sign     Worried About Running Out of Food in the Last Year: Never true     Ran Out of Food in the Last Year: Never true   Transportation Needs: No Transportation Needs (5/3/2021)    [facility]    PRAPARE - Transportation     Lack of Transportation (Medical): No     Lack of Transportation (Non-Medical): No   Physical Activity: Insufficiently Active (5/3/2021)    [facility]    Exercise Vital Sign     Days of Exercise per Week: 3 days     Minutes of Exercise per Session: 40 min   Stress: No Stress Concern Present (5/3/2021)    Received from [facility]    [employer] - Occupational Stress Questionnaire     Feeling of Stress : Not at all       Hospitalization/Major Diagnostic Procedure:     Review of Systems     General/Constitutional:  Chills denies. Fatigue denies. Fever denies. Weight gain denies. Weight loss denies.    Respiratory:  Shortness of breath denies.    Cardiovascular:  Chest pain denies.    Gastrointestinal:  Constipation denies. Diarrhea denies. Nausea denies. Vomiting denies.     Hematology:  Easy bruising denies. Prolonged bleeding denies.     Genitourinary:  Frequent urination denies. Pain in lower back denies. Painful urination denies.     Musculoskeletal:  See HPI for details    Skin:  Rash denies.    Neurologic:  Dizziness denies. Gait abnormalities denies. Seizures denies. Tingling/Numbess denies.    Psychiatric:  Anxiety denies. Depressed mood denies.     Objective:   Vital Signs:   Vitals:    07/01/24 1035   BP: 130/88        Physical Exam      General Examination:     Constitutional: The patient is alert and oriented to lace person and time. Mood is pleasant.     Head/Face: Normal facial features normal eyebrows    Eyes: Normal extraocular motion bilaterally    Lungs: Respirations are  equal and unlabored    Gait is coordinated.    Cardiovascular: There are no swelling or varicosities present.    Lymphatic: Negative for adenopathy    Skin: Normal    Neurological: Level of consciousness normal. Oriented to place person and time and situation    Psychiatric: Oriented to time place person and situation    Mild valgus alignment mild tenderness lateral joint line some patellofemoral crepitance noted [place]'s test negative Lachman's test negative    XRAY Report/ Interpretation :  AP lateral x-rays right knee were taken and personally reviewed today overall the appear to be normal with no significant degenerative changes      Assessment:       1. Chondromalacia patellae, right        Plan:       Vane was seen today for pain.    Diagnoses and all orders for this visit:    Chondromalacia patellae, right  -     X-Ray Knee 1 or 2 View Right  -     Large Joint Aspiration/Injection: R knee         Follow up if symptoms worsen or fail to improve.    Treatment options were discussed patient agreed to and underwent a steroid xylocaine injection in the right knee 4 cc Kenalog 3 cc of xylocaine no side effects were noted compressive dressing applied    Patient advised to contact me if injection unsuccessful if so we will proceed to MRI study otherwise return as needed      Treatment options were discussed with regards to the nature of the medical condition. Conservative pain intervention and surgical options were discussed in detail. The probability of success of each separate treatment option was discussed. The patient expressed a clear understanding of the treatment options. With regards to surgery, the procedure risk, benefits, complications, and outcomes were discussed. No guarantees were given with regards to surgical outcome.   The risk of complications, morbidity, and mortality of patient management decisions have been made at the time of this visit. These are associated with the patient's problems,  diagnostic procedures and treatment options. This includes the possible management options selected and those considered but not selected by the patient after shared medical decision making we discussed with the patient.     This note was created using Dragon voice recognition software that occasionally misinterpreted phrases or words.

## 2024-07-01 NOTE — PROCEDURES
Large Joint Aspiration/Injection: R knee    Date/Time: 7/1/2024 10:00 AM    Performed by: Kam Finney MD  Authorized by: Kam Finney MD    Consent Done?:  Yes (Verbal)  Indications:  Pain  Site marked: the procedure site was marked    Timeout: prior to procedure the correct patient, procedure, and site was verified    Prep: patient was prepped and draped in usual sterile fashion      Local anesthesia used?: Yes    Local anesthetic:  Lidocaine 1% without epinephrine    Details:  Needle Size:  22 G  Ultrasonic Guidance for needle placement?: No    Location:  Knee  Site:  R knee  Medications:  40 mg triamcinolone acetonide 40 mg/mL  Patient tolerance:  Patient tolerated the procedure well with no immediate complications

## 2024-11-14 DIAGNOSIS — H40.1131 PRIMARY OPEN ANGLE GLAUCOMA (POAG) OF BOTH EYES, MILD STAGE: ICD-10-CM

## 2024-11-15 RX ORDER — LATANOPROST 50 UG/ML
1 SOLUTION/ DROPS OPHTHALMIC
Qty: 2.5 ML | Refills: 12 | Status: SHIPPED | OUTPATIENT
Start: 2024-11-15

## 2025-06-20 ENCOUNTER — HOSPITAL ENCOUNTER (OUTPATIENT)
Dept: RADIOLOGY | Facility: HOSPITAL | Age: 75
Discharge: HOME | End: 2025-06-20
Payer: MEDICARE

## 2025-06-20 ENCOUNTER — OFFICE VISIT (OUTPATIENT)
Dept: ORTHOPEDICS | Facility: CLINIC | Age: 75
End: 2025-06-20
Payer: MEDICARE

## 2025-06-20 VITALS — HEIGHT: 69 IN | WEIGHT: 205.94 LBS | BODY MASS INDEX: 30.5 KG/M2

## 2025-06-20 DIAGNOSIS — M51.362 DEGENERATION OF INTERVERTEBRAL DISC OF LUMBAR REGION WITH DISCOGENIC BACK PAIN AND LOWER EXTREMITY PAIN: ICD-10-CM

## 2025-06-20 DIAGNOSIS — M54.16 LUMBAR RADICULOPATHY: ICD-10-CM

## 2025-06-20 DIAGNOSIS — M47.816 FACET ARTHRITIS OF LUMBAR REGION: Primary | ICD-10-CM

## 2025-06-20 PROCEDURE — 72170 X-RAY EXAM OF PELVIS: CPT | Mod: TC,PN

## 2025-06-20 PROCEDURE — 99214 OFFICE O/P EST MOD 30 MIN: CPT | Mod: PBBFAC,25,PN

## 2025-06-20 PROCEDURE — 72100 X-RAY EXAM L-S SPINE 2/3 VWS: CPT | Mod: TC,PN

## 2025-06-20 PROCEDURE — 72100 X-RAY EXAM L-S SPINE 2/3 VWS: CPT | Mod: 26,,, | Performed by: RADIOLOGY

## 2025-06-20 PROCEDURE — 72170 X-RAY EXAM OF PELVIS: CPT | Mod: 26,,, | Performed by: RADIOLOGY

## 2025-06-20 PROCEDURE — 99999 PR PBB SHADOW E&M-EST. PATIENT-LVL IV: CPT | Mod: PBBFAC,,,

## 2025-06-20 RX ORDER — METHYLPREDNISOLONE 4 MG/1
TABLET ORAL
Qty: 21 EACH | Refills: 0 | Status: SHIPPED | OUTPATIENT
Start: 2025-06-20 | End: 2025-07-11

## 2025-06-20 NOTE — PROGRESS NOTES
[facility]  [address]  [place] [address]  Phone: [phone]   [phone]    Patient's PCP: Nikki Zelaya MD  Referring Provider: No ref. provider found    Subjective:     Chief Complaint:   Chief Complaint   Patient presents with    Lumbar Spine - Pain     Lumbar pain x 2-3 years off and on. However, within the past day the pain has increased. Pain wakes from sleep. Pain begins at the L side low back and radiates down the side of her hip and lateral leg to the knee. Slight L sided upper groin pain, denies numbness and tingling. Has tried topical gels/creams with no relief.        Past Medical History:   Diagnosis Date    Acute on chronic diastolic congestive heart failure 12/10/2014    Asthma     Diabetes mellitus     GERD (gastroesophageal reflux disease)     Hyperlipidemia     Hypertension        Past Surgical History:   Procedure Laterality Date    CATARACT EXTRACTION Right 2/20/15    CATARACT EXTRACTION Left 2/11/15     SECTION      HYSTERECTOMY         Current Outpatient Medications   Medication Sig    albuterol (PROVENTIL/VENTOLIN HFA) 90 mcg/actuation inhaler Inhale 2 puffs into the lungs every 6 (six) hours as needed for Wheezing.    amLODIPine (NORVASC) 10 MG tablet Take 1 tablet by mouth once daily.    ammonium lactate 12 % Crea     aspirin 81 MG Chew Take 81 mg by mouth once daily.    atorvastatin (LIPITOR) 40 MG tablet Take 40 mg by mouth once daily.    empagliflozin (JARDIANCE) 10 mg tablet Take 1 tablet by mouth once daily.    famotidine (PEPCID) 20 MG tablet Take 20 mg by mouth 2 (two) times daily.    fluticasone propionate (FLONASE) 50 mcg/actuation nasal spray 1 spray by Nasal route.    lancets Misc 1 Device by Other route.    latanoprost 0.005 % ophthalmic solution INSTILL 1 DROP IN BOTH EYES EVERY DAY    levocetirizine (XYZAL) 5 MG tablet     losartan (COZAAR) 100 MG tablet TK 1 T PO D    ONETOUCH DELICA PLUS LANCET 33 gauge Misc Apply topically.     ONETOUCH ULTRA TEST Strp Use    potassium chloride SA (KLOR-CON M15) 15 MEQ tablet Take 15 mEq by mouth once daily.    rabeprazole (ACIPHEX) 20 mg tablet Take 20 mg by mouth once daily.    semaglutide (OZEMPIC) 0.25 mg or 0.5 mg (2 mg/3 mL) pen injector Inject into the skin.    tramadol (ULTRAM) 50 mg tablet     triamterene-hydrochlorothiazide 75-50 mg (MAXZIDE) 75-50 mg per tablet     fluticasone-salmeterol 100-50 mcg/dose (ADVAIR) 100-50 mcg/dose diskus inhaler Inhale 1 puff into the lungs 2 (two) times daily. (Patient not taking: Reported on 6/20/2025)    methylPREDNISolone (MEDROL DOSEPACK) 4 mg tablet use as directed    metoprolol succinate (TOPROL-XL) 100 MG 24 hr tablet Take 100 mg by mouth once daily. (Patient not taking: Reported on 6/20/2025)    valsartan (DIOVAN) 320 MG tablet Take 320 mg by mouth once daily. (Patient not taking: Reported on 6/20/2025)     No current facility-administered medications for this visit.       Review of patient's allergies indicates:   Allergen Reactions    Bactrim [sulfamethoxazole-trimethoprim] Rash    Pcn [penicillins] Swelling and Rash       Family History   Problem Relation Name Age of Onset    Cancer Father      Cataracts Sister      Hypertension Sister      Cancer Sister      Hypertension Brother      Diabetes Brother      Strabismus Neg Hx      Retinal detachment Neg Hx      Macular degeneration Neg Hx      Glaucoma Neg Hx      Blindness Neg Hx      Amblyopia Neg Hx         Social History[1]    Prior to meeting with the patient I reviewed the medical chart in [facility]. This included reviewing the previous progress notes from our office, review of the patient's last appointment with their primary care provider, review of any visits to the emergency room, and review of any pain management appointments or procedures.    History of present illness: 75 y.o. female  presenting with intermittent low back pain that has been present for 2-3 years.  She states that the pain has  increased significantly over the past day.  She admits to radiating pain, numbness, tingling throughout her left lower extremity to the level of her left knee.  She denies any radiating pain, numbness, tingling throughout her right lower extremity.  She denies any specific incident or trauma which caused the pain to arise.  She states that she has attempted tramadol, Voltaren gel, and over-the-counter oral anti-inflammatories without significant relief of her low back pain.  She states that her low back pain significantly affects her ability to perform her regular activities.  She was unable to sleep last night due to her low back pain.       Review of Systems:    Constitutional: Negative for chills, fever and weight loss.  HENT: Negative for congestion.    Eyes: Negative for discharge and redness.  Respiratory: Negative for cough and shortness of breath.    Cardiovascular: Negative for chest pain.  Gastrointestinal: Negative for nausea and vomiting.  Musculoskeletal: See HPI.  Skin: Negative for rash.  Neurological: Negative for headaches.  Endo/Heme/Allergies: Does not bruise/bleed easily.  Psychiatric/Behavioral: The patient is not nervous/anxious.    All other systems reviewed and are negative.       Objective:     Physical Examination:    Vital Signs: VITALS@    Body mass index is 30.41 kg/m².    This a well-developed, well nourished patient in no acute distress.  They are alert and oriented and cooperative to examination.    Lumbar spine exam: Skin overlying the lumbar spine is clean dry and intact.  No erythema or ecchymosis.  No signs or symptoms of infection.  Limited range of motion of the lumbar spine and flexion-extension, rotation, lateral bending secondary to pain.  Tenderness to left-sided paraspinal muscles.  Positive straight leg raise test on the left.  Negative Homans bilaterally.  Distally neurovascularly intact.    Pertinent New Results:      XRAY Report / Interpretation:   Two views of the  lumbar spine taken in clinic today reveal vertebral disc space narrowing most pronounced at L5-S1.  No acute fractures or dislocations.  Sclerosis noted throughout the facet joints of the lumbar spine.  AP view of the pelvis taken in clinic today reveal no acute fractures or dislocations.  Visualized soft tissues unremarkable.    Procedure/s:          Assessment:     1. Facet arthritis of lumbar region    2. Degeneration of intervertebral disc of lumbar region with discogenic back pain and lower extremity pain    3. Lumbar radiculopathy      Plan:    Facet arthritis of lumbar region  -     X-Ray Lumbar Spine Ap And Lateral  -     X-Ray Pelvis Routine AP  -     methylPREDNISolone (MEDROL DOSEPACK) 4 mg tablet; use as directed  Dispense: 21 each; Refill: 0    Degeneration of intervertebral disc of lumbar region with discogenic back pain and lower extremity pain  -     methylPREDNISolone (MEDROL DOSEPACK) 4 mg tablet; use as directed  Dispense: 21 each; Refill: 0    Lumbar radiculopathy  -     methylPREDNISolone (MEDROL DOSEPACK) 4 mg tablet; use as directed  Dispense: 21 each; Refill: 0        Follow up if symptoms worsen or fail to improve.    Degenerative disc disease lumbar spine, facet joint arthritis of the lumbar spine, lumbar radiculopathy.  I prescribed her a Medrol Dosepak for her acute exacerbation of chronic low back pain with left lower extremity radiculopathy.  I did inform her to her blood sugars closely as she is a diabetic.  I encouraged her to continue use of previously prescribed tramadol as needed for her low back pain.  I did encourage her to walk regularly to help relieve her acute low back pain.  I would like her to follow up if her low back pain worsens or does not improve with the recommended treatment.    The patient and I had a thorough discussion today.  We discussed the working diagnosis as well as several other potential alternative diagnoses.  We discussed treatment options, both  conservative and surgical.  Conservative treatment options would include things such as activity modifications, workplace modifications, a period of rest, oral versus topical over the counter and oral versus topical prescription anti-inflammatory medications, physical therapy / occupational therapy, splinting / bracing, immobilization, corticosteroid injections, and others.      Bret Paredes PA-C      EMR Statement:  Please note that portions of this patient encounter record were imported from the patients electronic medical record and that others were dictated using voice recognition software. For these reasons grammatical errors, nonsensical language, and apparently contradictory statements may be present.  These should be disregarded or interpreted with respect to the context of the document.       [1]   Social History  Socioeconomic History    Marital status:    Tobacco Use    Smoking status: Former    Smokeless tobacco: Never   Substance and Sexual Activity    Alcohol use: No    Drug use: No     Social Drivers of Health     Financial Resource Strain: Low Risk  (5/3/2021)    Received from [facility]    Overall Financial Resource Strain (CARDIA)     Difficulty of Paying Living Expenses: Not hard at all   Food Insecurity: No Food Insecurity (5/3/2021)    Received from [facility]    Hunger Vital Sign     Worried About Running Out of Food in the Last Year: Never true     Ran Out of Food in the Last Year: Never true   Transportation Needs: No Transportation Needs (5/3/2021)    Received from [facility]    PRAPARE - Transportation     Lack of Transportation (Medical): No     Lack of Transportation (Non-Medical): No   Physical Activity: Insufficiently Active (5/3/2021)    Received from [facility]    Exercise Vital Sign     Days of Exercise per Week: 3 days     Minutes of Exercise per Session: 40 min   Stress: No Stress Concern Present (5/3/2021)    Received from [facility] - Occupational Stress Questionnaire     Feeling of Stress : Not at all

## 2025-06-30 ENCOUNTER — TELEPHONE (OUTPATIENT)
Dept: ORTHOPEDICS | Facility: CLINIC | Age: 75
End: 2025-06-30
Payer: MEDICARE

## 2025-06-30 DIAGNOSIS — M51.362 DEGENERATION OF INTERVERTEBRAL DISC OF LUMBAR REGION WITH DISCOGENIC BACK PAIN AND LOWER EXTREMITY PAIN: ICD-10-CM

## 2025-06-30 DIAGNOSIS — M47.816 FACET ARTHRITIS OF LUMBAR REGION: ICD-10-CM

## 2025-06-30 DIAGNOSIS — M54.16 LUMBAR RADICULOPATHY: Primary | ICD-10-CM

## 2025-07-05 ENCOUNTER — HOSPITAL ENCOUNTER (OUTPATIENT)
Dept: RADIOLOGY | Facility: OTHER | Age: 75
Discharge: HOME | End: 2025-07-05
Attending: ORTHOPAEDIC SURGERY
Payer: MEDICARE

## 2025-07-05 DIAGNOSIS — M54.16 LUMBAR RADICULOPATHY: ICD-10-CM

## 2025-07-05 DIAGNOSIS — M51.362 DEGENERATION OF INTERVERTEBRAL DISC OF LUMBAR REGION WITH DISCOGENIC BACK PAIN AND LOWER EXTREMITY PAIN: ICD-10-CM

## 2025-07-05 DIAGNOSIS — M47.816 FACET ARTHRITIS OF LUMBAR REGION: ICD-10-CM

## 2025-07-05 PROCEDURE — 72148 MRI LUMBAR SPINE W/O DYE: CPT | Mod: TC

## 2025-07-05 PROCEDURE — 72148 MRI LUMBAR SPINE W/O DYE: CPT | Mod: 26,,, | Performed by: RADIOLOGY

## 2025-07-30 ENCOUNTER — OFFICE VISIT (OUTPATIENT)
Dept: ORTHOPEDICS | Facility: CLINIC | Age: 75
End: 2025-07-30
Payer: MEDICARE

## 2025-07-30 VITALS — WEIGHT: 210 LBS | BODY MASS INDEX: 31.1 KG/M2 | HEIGHT: 69 IN

## 2025-07-30 DIAGNOSIS — M51.362 DEGENERATION OF INTERVERTEBRAL DISC OF LUMBAR REGION WITH DISCOGENIC BACK PAIN AND LOWER EXTREMITY PAIN: ICD-10-CM

## 2025-07-30 DIAGNOSIS — M51.26 PROTRUSION OF LUMBAR INTERVERTEBRAL DISC: Primary | ICD-10-CM

## 2025-07-30 DIAGNOSIS — M47.816 FACET ARTHRITIS OF LUMBAR REGION: ICD-10-CM

## 2025-07-30 PROCEDURE — 99213 OFFICE O/P EST LOW 20 MIN: CPT | Mod: S$PBB,,, | Performed by: ORTHOPAEDIC SURGERY

## 2025-07-30 PROCEDURE — 99999 PR PBB SHADOW E&M-EST. PATIENT-LVL IV: CPT | Mod: PBBFAC,,, | Performed by: ORTHOPAEDIC SURGERY

## 2025-07-30 PROCEDURE — 99214 OFFICE O/P EST MOD 30 MIN: CPT | Mod: PBBFAC,PN | Performed by: ORTHOPAEDIC SURGERY

## 2025-07-30 RX ORDER — SPIRONOLACTONE 25 MG/1
25 TABLET ORAL
COMMUNITY

## 2025-07-30 RX ORDER — FUROSEMIDE 40 MG/1
40 TABLET ORAL
COMMUNITY

## 2025-07-30 RX ORDER — GABAPENTIN 300 MG/1
CAPSULE ORAL
Qty: 90 CAPSULE | Refills: 1 | Status: SHIPPED | OUTPATIENT
Start: 2025-07-30

## 2025-07-30 NOTE — PROGRESS NOTES
Subjective:       Patient ID: Vane Earl is a 75 y.o. female.    Chief Complaint: Pain of the Lumbar Spine (MRI Lumbar results, back pain is same, no leg pain today, typically left leg worse than right to the foot, no numbness)      History of Present Illness    Prior to meeting with the patient I reviewed the medical chart in [facility]. This included reviewing the previous progress notes from our office, review of the patient's last appointment with their primary care provider, review of any visits to the emergency room, and review of any pain management appointments or procedures.   1-2 month history of intermittent back pain radiating to left leg insidious in onset.  Has tried a steroid Dosepak as well as anti-inflammatories but stopped the anti-inflammatories because she has kidney disease.  Has been taking Tylenol which helps.  Had an MRI here for review of the MRI    Current Medications  Current Medications[1]    Allergies  Review of patient's allergies indicates:   Allergen Reactions    Bactrim [sulfamethoxazole-trimethoprim] Rash    Pcn [penicillins] Swelling and Rash       Past Medical History  Past Medical History:   Diagnosis Date    Acute on chronic diastolic congestive heart failure 12/10/2014    Asthma     Diabetes mellitus     GERD (gastroesophageal reflux disease)     Hyperlipidemia     Hypertension        Surgical History  Past Surgical History:   Procedure Laterality Date    CATARACT EXTRACTION Right 2/20/15    CATARACT EXTRACTION Left 2/11/15     SECTION      HYSTERECTOMY         Family History:   Family History   Problem Relation Name Age of Onset    Cancer Father      Cataracts Sister      Hypertension Sister      Cancer Sister      Hypertension Brother      Diabetes Brother      Strabismus Neg Hx      Retinal detachment Neg Hx      Macular degeneration Neg Hx      Glaucoma Neg Hx      Blindness Neg Hx      Amblyopia Neg Hx         Social History:   Social  History[2]    Hospitalization/Major Diagnostic Procedure:     Review of Systems     General/Constitutional:  Chills denies. Fatigue denies. Fever denies. Weight gain denies. Weight loss denies.    Respiratory:  Shortness of breath denies.    Cardiovascular:  Chest pain denies.    Gastrointestinal:  Constipation denies. Diarrhea denies. Nausea denies. Vomiting denies.     Hematology:  Easy bruising denies. Prolonged bleeding denies.     Genitourinary:  Frequent urination denies. Pain in lower back denies. Painful urination denies.     Musculoskeletal:  See HPI for details    Skin:  Rash denies.    Neurologic:  Dizziness denies. Gait abnormalities denies. Seizures denies. Tingling/Numbess denies.    Psychiatric:  Anxiety denies. Depressed mood denies.     Objective:   Vital Signs: There were no vitals filed for this visit.     Physical Exam      General Examination:     Constitutional: The patient is alert and oriented to lace person and time. Mood is pleasant.     Head/Face: Normal facial features normal eyebrows    Eyes: Normal extraocular motion bilaterally    Lungs: Respirations are equal and unlabored    Gait is coordinated.    Cardiovascular: There are no swelling or varicosities present.    Lymphatic: Negative for adenopathy    Skin: Normal    Neurological: Level of consciousness normal. Oriented to place person and time and situation    Psychiatric: Oriented to time place person and situation    Hip and knee range of motion intact straight leg raising weakly positive on the right side motor exam grossly normal lumbar range motion limited  XRAY Report/ Interpretation:   MRI personally reviewed I agree radiology interpretation multilevel facet arthritis and foraminal disc herniation left-sided L4-5       MRI Lumbar Spine Without Contrast  Order: [id number]   Status: Final result       Next appt: None       Dx: Lumbar radiculopathy; Facet arthritis...    Test Result Released: Yes (not seen)    0 Result  Notes  Details    Reading Physician Reading Date Result Priority   Shubham Diop MD  [phone] 7/5/2025 Routine     Narrative & Impression  EXAMINATION:  MRI LUMBAR SPINE WITHOUT CONTRAST     CLINICAL HISTORY:  lumbar radiculopathy; Radiculopathy, lumbar region     TECHNIQUE:  Multiplanar, multisequence MR images were acquired from the thoracolumbar junction to the sacrum without the administration of contrast.     COMPARISON:  Radiograph 06/20/2025.     FINDINGS:  Mild grade 1 retrolisthesis of L5 on S1.  No spondylolysis.  Vertebral body heights are well maintained.  No acute fractures.  No marrow signal abnormality to suggest an infiltrative process.     Degenerative disc desiccation throughout the visualized thoracolumbar spine with mild height loss at L3-L5 and moderate height loss at L5-S1.  Degenerative fatty endplate change at L5-S1.  No endplate edema.  Left foraminal annular fissure at L4-L5.  Posterior annular fissure at L5-S1.     Distal spinal cord demonstrates normal contour and signal intensity.  Conus medullaris terminates at L1-L2.  Cauda equina is normal.     Left renal cyst.  SI joints are symmetric.  Paraspinal musculature demonstrates normal bulk and signal intensity.     T12-L1: No spinal canal stenosis or neural foraminal narrowing.     L1-L2: No spinal canal stenosis or neural foraminal narrowing.     L2-L3: No spinal canal stenosis or neural foraminal narrowing.     L3-L4: Mild bilateral facet arthropathy and ligamentum flavum hypertrophy.  Mild spinal canal stenosis.  No neural foraminal narrowing.     L4-L5: Circumferential disc bulge with a superimposed left foraminal/extraforaminal protrusion that abuts the left exiting L4 nerve root.  Mild left and moderate right facet arthropathy with a right posteriorly oriented synovial cyst.  Ligamentum flavum hypertrophy.  Mild-to-moderate spinal canal stenosis.  Mild-to-moderate left neural foraminal narrowing.     L5-S1: Circumferential  disc bulge.  Mild bilateral facet arthropathy.  No spinal canal stenosis.  Mild bilateral neural foraminal narrowing.     Impression:     1. Left foraminal/extraforaminal disc protrusion at L4-L5 contributes to mild-to-moderate left neural foraminal narrowing and abuts the left exiting L4.  2. Additional lumbar spondylosis as detailed above.  Mild-to-moderate spinal canal stenosis at L3-L5.  Mild neural foraminal narrowing at L5-S1.        Electronically signed by:Shubham Diop MD  Date:                                            07/05/2025  Time:                                           15:56        Exam Ended: 07/05/25 11:04 CDT Last          Assessment:       1. Protrusion of lumbar intervertebral disc    2. Degeneration of intervertebral disc of lumbar region with discogenic back pain and lower extremity pain    3. Facet arthritis of lumbar region        Plan:       Vane was seen today for pain.    Diagnoses and all orders for this visit:    Protrusion of lumbar intervertebral disc    Degeneration of intervertebral disc of lumbar region with discogenic back pain and lower extremity pain    Facet arthritis of lumbar region         No follow-ups on file.    Symptomatic foraminal disc herniation L4-5 left side.  Treatment options were discussed and will be started on physical therapy and gabapentin.  She will take Tylenol over the counter as she wants to avoid anti-inflammatories due to elevated creatinine and kidney function studies return 1 month consider transforaminal epidural steroid injection if not improved  Treatment options were discussed with regards to the nature of the medical condition. Conservative pain intervention and surgical options were discussed in detail. The probability of success of each separate treatment option was discussed. The patient expressed a clear understanding of the treatment options. With regards to surgery, the procedure risk, benefits, complications, and outcomes were  discussed. No guarantees were given with regards to surgical outcome.   The risk of complications, morbidity, and mortality of patient management decisions have been made at the time of this visit. These are associated with the patient's problems, diagnostic procedures and treatment options. This includes the possible management options selected and those considered but not selected by the patient after shared medical decision making we discussed with the patient.     This note was created using Dragon voice recognition software that occasionally misinterpreted phrases or words.         [1]   Current Outpatient Medications   Medication Sig Dispense Refill    albuterol (PROVENTIL/VENTOLIN HFA) 90 mcg/actuation inhaler Inhale 2 puffs into the lungs every 6 (six) hours as needed for Wheezing.      amLODIPine (NORVASC) 10 MG tablet Take 1 tablet by mouth once daily.      ammonium lactate 12 % Crea   6    aspirin 81 MG Chew Take 81 mg by mouth once daily.      atorvastatin (LIPITOR) 40 MG tablet Take 40 mg by mouth once daily.      empagliflozin (JARDIANCE) 10 mg tablet Take 1 tablet by mouth once daily.      famotidine (PEPCID) 20 MG tablet Take 20 mg by mouth 2 (two) times daily.      fluticasone propionate (FLONASE) 50 mcg/actuation nasal spray 1 spray by Nasal route.      fluticasone-salmeterol 100-50 mcg/dose (ADVAIR) 100-50 mcg/dose diskus inhaler Inhale 1 puff into the lungs 2 (two) times daily.      furosemide (LASIX) 40 MG tablet Take 40 mg by mouth.      lancets Misc 1 Device by Other route.      latanoprost 0.005 % ophthalmic solution INSTILL 1 DROP IN BOTH EYES EVERY DAY 2.5 mL 12    levocetirizine (XYZAL) 5 MG tablet       losartan (COZAAR) 100 MG tablet TK 1 T PO D      metoprolol succinate (TOPROL-XL) 100 MG 24 hr tablet Take 100 mg by mouth once daily.      ONETOUCH DELICA PLUS LANCET 33 gauge Misc Apply topically.      ONETOUCH ULTRA TEST Strp Use      potassium chloride SA (KLOR-CON M15) 15 MEQ tablet  Take 15 mEq by mouth once daily.      rabeprazole (ACIPHEX) 20 mg tablet Take 20 mg by mouth once daily.      semaglutide (OZEMPIC) 0.25 mg or 0.5 mg (2 mg/3 mL) pen injector Inject into the skin.      spironolactone (ALDACTONE) 25 MG tablet Take 25 mg by mouth.      tramadol (ULTRAM) 50 mg tablet   1    triamterene-hydrochlorothiazide 75-50 mg (MAXZIDE) 75-50 mg per tablet       valsartan (DIOVAN) 320 MG tablet Take 320 mg by mouth once daily.       No current facility-administered medications for this visit.   [2]   Social History  Socioeconomic History    Marital status:    Tobacco Use    Smoking status: Former    Smokeless tobacco: Never   Substance and Sexual Activity    Alcohol use: No    Drug use: No     Social Drivers of Health     Financial Resource Strain: Low Risk  (5/3/2021)    Received from [facility]    Overall Financial Resource Strain (CARDIA)     Difficulty of Paying Living Expenses: Not hard at all   Food Insecurity: No Food Insecurity (5/3/2021)    Received from [facility]    Hunger Vital Sign     Worried About Running Out of Food in the Last Year: Never true     Ran Out of Food in the Last Year: Never true   Transportation Needs: No Transportation Needs (5/3/2021)    Received from [facility]    PRAPARE - Transportation     Lack of Transportation (Medical): No     Lack of Transportation (Non-Medical): No   Physical Activity: Insufficiently Active (5/3/2021)    Received from [facility]    Exercise Vital Sign     Days of Exercise per Week: 3 days     Minutes of Exercise per Session: 40 min   Stress: No Stress Concern Present (5/3/2021)    Received from [facility]    [employer] [place] [employer] - Occupational Stress Questionnaire     Feeling of Stress : Not at all

## 2025-08-27 ENCOUNTER — OFFICE VISIT (OUTPATIENT)
Dept: ORTHOPEDICS | Facility: CLINIC | Age: 75
End: 2025-08-27
Payer: MEDICARE

## 2025-08-27 VITALS — WEIGHT: 210.13 LBS | BODY MASS INDEX: 31.12 KG/M2 | HEIGHT: 69 IN

## 2025-08-27 DIAGNOSIS — M54.16 LUMBAR RADICULOPATHY: ICD-10-CM

## 2025-08-27 DIAGNOSIS — M51.26 PROTRUSION OF LUMBAR INTERVERTEBRAL DISC: Primary | ICD-10-CM

## 2025-08-27 DIAGNOSIS — M51.362 DEGENERATION OF INTERVERTEBRAL DISC OF LUMBAR REGION WITH DISCOGENIC BACK PAIN AND LOWER EXTREMITY PAIN: ICD-10-CM

## 2025-08-27 DIAGNOSIS — M47.816 FACET ARTHRITIS OF LUMBAR REGION: ICD-10-CM

## 2025-08-27 PROCEDURE — 99214 OFFICE O/P EST MOD 30 MIN: CPT | Mod: PBBFAC,PN | Performed by: ORTHOPAEDIC SURGERY

## 2025-08-27 PROCEDURE — 99999 PR PBB SHADOW E&M-EST. PATIENT-LVL IV: CPT | Mod: PBBFAC,,, | Performed by: ORTHOPAEDIC SURGERY

## 2025-08-27 PROCEDURE — 99213 OFFICE O/P EST LOW 20 MIN: CPT | Mod: S$PBB,,, | Performed by: ORTHOPAEDIC SURGERY
